# Patient Record
Sex: MALE | Race: WHITE | NOT HISPANIC OR LATINO | URBAN - METROPOLITAN AREA
[De-identification: names, ages, dates, MRNs, and addresses within clinical notes are randomized per-mention and may not be internally consistent; named-entity substitution may affect disease eponyms.]

---

## 2023-09-15 ENCOUNTER — OFFICE VISIT (OUTPATIENT)
Dept: URBAN - METROPOLITAN AREA CLINIC 96 | Facility: CLINIC | Age: 33
End: 2023-09-15
Payer: COMMERCIAL

## 2023-09-15 ENCOUNTER — WEB ENCOUNTER (OUTPATIENT)
Dept: URBAN - METROPOLITAN AREA CLINIC 96 | Facility: CLINIC | Age: 33
End: 2023-09-15

## 2023-09-15 ENCOUNTER — DASHBOARD ENCOUNTERS (OUTPATIENT)
Age: 33
End: 2023-09-15

## 2023-09-15 VITALS
TEMPERATURE: 98.6 F | HEIGHT: 69 IN | DIASTOLIC BLOOD PRESSURE: 83 MMHG | WEIGHT: 177 LBS | HEART RATE: 51 BPM | BODY MASS INDEX: 26.22 KG/M2 | SYSTOLIC BLOOD PRESSURE: 139 MMHG

## 2023-09-15 DIAGNOSIS — K21.9 GASTROESOPHAGEAL REFLUX DISEASE, UNSPECIFIED WHETHER ESOPHAGITIS PRESENT: ICD-10-CM

## 2023-09-15 PROBLEM — 235595009: Status: ACTIVE | Noted: 2023-09-15

## 2023-09-15 PROCEDURE — 99243 OFF/OP CNSLTJ NEW/EST LOW 30: CPT | Performed by: INTERNAL MEDICINE

## 2023-09-15 RX ORDER — OMEPRAZOLE 20 MG/1
1 CAPSULE CAPSULE, DELAYED RELEASE ORAL TWICE A DAY
Qty: 180 CAPSULE | Refills: 3 | OUTPATIENT
Start: 2023-09-15

## 2023-10-19 NOTE — HPI-TODAY'S VISIT:
FAX      AUTHORIZATION FOR RELEASE OF   CONFIDENTIAL INFORMATION      PRECISION [facility]       We are seeing Cheryl Roberts, date of birth 1985, in the clinic at [facility]. Argentina Soto MD is the patient's PCP. Cheryl Roberts has an outstanding lab/procedure at the time we reviewed their chart. In order to help keep their health information updated, Cheryl Roberts has authorized us to request the following medical records:      DIABETIC EYE EXAM - WITH RETINAL SCREENING (MOST RECENT WITHIN 48 MONTHS)    Please fax records to Argentina Soto MD at [facility] [phone].    If you have any questions, please contact Mary Grace at [phone]      Mary Grace Kelly LPN  Performance Improvement Coordinator for Population Health  [facility]    Patient Name: Cheryl Roberts  : 1985  Patient Phone #: [phone]      This patient was referred by Dr. Love Faustin for an evaluation of reflux.  A copy of this will be sent to the referring provider.  32 y.o. WM, single, works for [employer] as  Have had bad indigestion and acid reflux entire life Use OTC stuff Worse in past couple of yrs Finally here to check out Have tried Tums prn, Prevacid, Omeprazole Rx strength works - almost the only thing that works Currently using Omeprazole 40mg - does well Will try to get it down to 20mg but some days just acts up No swallowing issues No N/V No unintentional wt loss In high school -- swimmer -- horizontal - lots of indigestion - popped a lot of tums In high school -- took acne medicine -- laid down -- terrible pain -- couldn't eat for 2 weeks -- never saw a doc When doesn't take omeprazole, can't eat much w/o horrible indigestion 20 usually works but will bump up to 40 for a few days